# Patient Record
Sex: MALE | Race: WHITE | URBAN - METROPOLITAN AREA
[De-identification: names, ages, dates, MRNs, and addresses within clinical notes are randomized per-mention and may not be internally consistent; named-entity substitution may affect disease eponyms.]

---

## 2021-05-20 ENCOUNTER — OFFICE VISIT (OUTPATIENT)
Dept: URBAN - METROPOLITAN AREA CLINIC 91 | Facility: CLINIC | Age: 56
End: 2021-05-20
Payer: COMMERCIAL

## 2021-05-20 DIAGNOSIS — H40.1132 PRIMARY OPEN-ANGLE GLAUCOMA, BILATERAL, MODERATE STAGE: Primary | ICD-10-CM

## 2021-05-20 DIAGNOSIS — H25.13 AGE-RELATED NUCLEAR CATARACT, BILATERAL: ICD-10-CM

## 2021-05-20 PROCEDURE — 92133 CPTRZD OPH DX IMG PST SGM ON: CPT | Performed by: SPECIALIST

## 2021-05-20 PROCEDURE — 99213 OFFICE O/P EST LOW 20 MIN: CPT | Performed by: SPECIALIST

## 2021-05-20 RX ORDER — TADALAFIL 10 MG/1
10 MG TABLET, FILM COATED ORAL
Refills: 0 | Status: ACTIVE
Start: 2021-05-20

## 2021-05-20 RX ORDER — LEVOTHYROXINE SODIUM 125 UG/1
TABLET ORAL
Refills: 0 | Status: ACTIVE
Start: 2021-05-20

## 2021-05-20 RX ORDER — LATANOPROST 50 UG/ML
0.005 % SOLUTION OPHTHALMIC
Qty: 7.5 | Refills: 3 | Status: INACTIVE
Start: 2021-05-20 | End: 2021-09-16

## 2021-05-20 RX ORDER — BRIMONIDINE TARTRATE 1 MG/ML
0.1 % SOLUTION/ DROPS OPHTHALMIC
Qty: 7.5 | Refills: 3 | Status: INACTIVE
Start: 2021-05-20 | End: 2021-09-16

## 2021-05-20 RX ORDER — LATANOPROST 50 UG/ML
0.005 % SOLUTION OPHTHALMIC
Refills: 0 | Status: ACTIVE
Start: 2021-05-20

## 2021-05-20 RX ORDER — BRIMONIDINE TARTRATE 1 MG/ML
0.1 % SOLUTION/ DROPS OPHTHALMIC
Refills: 0 | Status: INACTIVE
Start: 2021-05-20 | End: 2021-05-20

## 2021-05-20 RX ORDER — BRIMONIDINE TARTRATE 1 MG/ML
0.1 % SOLUTION/ DROPS OPHTHALMIC
Refills: 0 | Status: ACTIVE
Start: 2021-05-20

## 2021-05-20 ASSESSMENT — INTRAOCULAR PRESSURE
OD: 15
OS: 17

## 2021-05-20 ASSESSMENT — VISUAL ACUITY
OS: 20/25
OD: 20/20

## 2021-05-20 NOTE — IMPRESSION/PLAN
Impression: Primary open-angle glaucoma, bilateral, moderate stage: O81.8620. Plan: IOP's and OCTg stable OU, continue Alphagan BID and Latanoprost QHS OU. Discussed primary open angle glaucoma. I have stressed importance of patient compliance with follow-up appointments and using glaucoma drops as directed. I have explained the risk of irreversible vision loss and possible blindness associated with glaucoma. Will continue to monitor patient's glaucoma status with OCTg, visual field testing and fundus photography.

## 2021-09-28 ENCOUNTER — OFFICE VISIT (OUTPATIENT)
Dept: URBAN - METROPOLITAN AREA CLINIC 91 | Facility: CLINIC | Age: 56
End: 2021-09-28
Payer: COMMERCIAL

## 2021-09-28 PROCEDURE — 92133 CPTRZD OPH DX IMG PST SGM ON: CPT | Performed by: SPECIALIST

## 2021-09-28 PROCEDURE — 76514 ECHO EXAM OF EYE THICKNESS: CPT | Performed by: SPECIALIST

## 2021-09-28 PROCEDURE — 99214 OFFICE O/P EST MOD 30 MIN: CPT | Performed by: SPECIALIST

## 2021-09-28 PROCEDURE — 92083 EXTENDED VISUAL FIELD XM: CPT | Performed by: SPECIALIST

## 2021-09-28 ASSESSMENT — INTRAOCULAR PRESSURE
OS: 20
OD: 20

## 2021-09-28 NOTE — IMPRESSION/PLAN
Impression: Primary open-angle glaucoma, bilateral, moderate stage: F68.3210. Plan: Due to elevated IOP and progression on VF will need to add timolol BID OU if pressure does not respond will consider switching Latanoprost to Vyzulta. Discussed primary open angle glaucoma. I have stressed importance of patient compliance with follow-up appointments and using glaucoma drops as directed. I have explained the risk of irreversible vision loss and possible blindness associated with glaucoma. Will continue to monitor patient's glaucoma status with OCTg, visual field testing and fundus photography.

## 2022-11-17 ENCOUNTER — OFFICE VISIT (OUTPATIENT)
Dept: URBAN - METROPOLITAN AREA CLINIC 91 | Facility: CLINIC | Age: 57
End: 2022-11-17
Payer: COMMERCIAL

## 2022-11-17 DIAGNOSIS — H40.1132 PRIMARY OPEN-ANGLE GLAUCOMA, BILATERAL, MODERATE STAGE: Primary | ICD-10-CM

## 2022-11-17 PROCEDURE — 92250 FUNDUS PHOTOGRAPHY W/I&R: CPT | Performed by: SPECIALIST

## 2022-11-17 PROCEDURE — 99214 OFFICE O/P EST MOD 30 MIN: CPT | Performed by: SPECIALIST

## 2022-11-17 RX ORDER — TIMOLOL MALEATE 5 MG/ML
0.5 % SOLUTION/ DROPS OPHTHALMIC
Qty: 7.5 | Refills: 3 | Status: ACTIVE
Start: 2022-11-17

## 2022-11-17 ASSESSMENT — INTRAOCULAR PRESSURE
OS: 20
OD: 15

## 2022-11-17 NOTE — IMPRESSION/PLAN
Impression: Primary open-angle glaucoma, bilateral, moderate stage: C10.7336. Plan: Elevated IOP's emphasize importance of using glaucoma gtts as directed, risk of blindness explained. Continue using latanoprost QHS, Alphagan BID, in addition will add Timolol BID OU. Due to progression on glaucoma recommend to see Dr Mark Guerrero glaucoma specialist for consultation. Will continue to observe. Discussed primary open angle glaucoma. I have stressed importance of patient compliance with follow-up appointments and using glaucoma drops as directed. I have explained the risk of irreversible vision loss and possible blindness associated with glaucoma. Will continue to monitor patient's glaucoma status with OCTg, visual field testing and fundus photography.

## 2023-01-18 ENCOUNTER — OFFICE VISIT (OUTPATIENT)
Dept: URBAN - METROPOLITAN AREA CLINIC 91 | Facility: CLINIC | Age: 58
End: 2023-01-18
Payer: COMMERCIAL

## 2023-01-18 DIAGNOSIS — H40.1132 PRIMARY OPEN-ANGLE GLAUCOMA, BILATERAL, MODERATE STAGE: Primary | ICD-10-CM

## 2023-01-18 PROCEDURE — 99213 OFFICE O/P EST LOW 20 MIN: CPT | Performed by: SPECIALIST

## 2023-01-18 PROCEDURE — 92133 CPTRZD OPH DX IMG PST SGM ON: CPT | Performed by: SPECIALIST

## 2023-01-18 ASSESSMENT — INTRAOCULAR PRESSURE
OS: 14
OD: 14

## 2023-01-18 ASSESSMENT — VISUAL ACUITY
OS: 20/20
OD: 20/20

## 2023-01-18 NOTE — IMPRESSION/PLAN
Impression: Primary open-angle glaucoma, bilateral, moderate stage: E84.1023. Plan: IOP's have improved, continue using glaucoma gtts as directed, pt following closely with Dr Ingrid Ragsdale, has appt next week. Will continue to observe. Discussed primary open angle glaucoma. I have stressed importance of patient compliance with follow-up appointments and using glaucoma drops as directed. I have explained the risk of irreversible vision loss and possible blindness associated with glaucoma. Will continue to monitor patient's glaucoma status with OCTg, visual field testing and fundus photography.